# Patient Record
Sex: FEMALE | Race: WHITE | NOT HISPANIC OR LATINO | Employment: OTHER | RURAL
[De-identification: names, ages, dates, MRNs, and addresses within clinical notes are randomized per-mention and may not be internally consistent; named-entity substitution may affect disease eponyms.]

---

## 2022-10-02 ENCOUNTER — HOSPITAL ENCOUNTER (EMERGENCY)
Facility: HOSPITAL | Age: 77
Discharge: HOME | End: 2022-10-02
Attending: SPECIALIST
Payer: MEDICARE

## 2022-10-02 VITALS
OXYGEN SATURATION: 98 % | BODY MASS INDEX: 23.86 KG/M2 | WEIGHT: 152 LBS | SYSTOLIC BLOOD PRESSURE: 113 MMHG | RESPIRATION RATE: 20 BRPM | TEMPERATURE: 100 F | HEART RATE: 67 BPM | HEIGHT: 67 IN | DIASTOLIC BLOOD PRESSURE: 65 MMHG

## 2022-10-02 DIAGNOSIS — R05.1 ACUTE COUGH: Primary | ICD-10-CM

## 2022-10-02 DIAGNOSIS — R05.9 COUGH: ICD-10-CM

## 2022-10-02 DIAGNOSIS — U07.1 COVID: ICD-10-CM

## 2022-10-02 LAB
ANION GAP SERPL CALCULATED.3IONS-SCNC: 11 MMOL/L (ref 7–16)
BUN SERPL-MCNC: 25 MG/DL (ref 7–18)
BUN/CREAT SERPL: 20 (ref 6–20)
CALCIUM SERPL-MCNC: 8.4 MG/DL (ref 8.5–10.1)
CHLORIDE SERPL-SCNC: 98 MMOL/L (ref 98–107)
CO2 SERPL-SCNC: 28 MMOL/L (ref 21–32)
CREAT SERPL-MCNC: 1.26 MG/DL (ref 0.55–1.02)
EGFR (NO RACE VARIABLE) (RUSH/TITUS): 44 ML/MIN/1.73M²
FLUAV AG UPPER RESP QL IA.RAPID: NEGATIVE
FLUBV AG UPPER RESP QL IA.RAPID: NEGATIVE
GLUCOSE SERPL-MCNC: 98 MG/DL (ref 74–106)
HCO3 UR-SCNC: 29.7 MMOL/L (ref 21–28)
PCO2 BLDA: 39 MMHG (ref 35–48)
PH SMN: 7.49 [PH] (ref 7.35–7.45)
PO2 BLDA: 79 MMHG (ref 83–108)
POC BASE EXCESS: 5.9 MMOL/L (ref -2–3)
POC SATURATED O2: 97 %
POTASSIUM SERPL-SCNC: 4.8 MMOL/L (ref 3.5–5.1)
SARS-COV+SARS-COV-2 AG RESP QL IA.RAPID: POSITIVE
SODIUM SERPL-SCNC: 132 MMOL/L (ref 136–145)

## 2022-10-02 PROCEDURE — 82803 BLOOD GASES ANY COMBINATION: CPT

## 2022-10-02 PROCEDURE — 80048 BASIC METABOLIC PNL TOTAL CA: CPT | Performed by: SPECIALIST

## 2022-10-02 PROCEDURE — 63600175 PHARM REV CODE 636 W HCPCS: Performed by: SPECIALIST

## 2022-10-02 PROCEDURE — 87428 SARSCOV & INF VIR A&B AG IA: CPT | Performed by: SPECIALIST

## 2022-10-02 PROCEDURE — 99284 EMERGENCY DEPT VISIT MOD MDM: CPT | Mod: 25

## 2022-10-02 PROCEDURE — 96372 THER/PROPH/DIAG INJ SC/IM: CPT

## 2022-10-02 PROCEDURE — 99900035 HC TECH TIME PER 15 MIN (STAT)

## 2022-10-02 PROCEDURE — 36415 COLL VENOUS BLD VENIPUNCTURE: CPT | Performed by: SPECIALIST

## 2022-10-02 PROCEDURE — 99284 EMERGENCY DEPT VISIT MOD MDM: CPT | Performed by: SPECIALIST

## 2022-10-02 PROCEDURE — 36600 WITHDRAWAL OF ARTERIAL BLOOD: CPT

## 2022-10-02 RX ORDER — DEXAMETHASONE SODIUM PHOSPHATE 4 MG/ML
8 INJECTION, SOLUTION INTRA-ARTICULAR; INTRALESIONAL; INTRAMUSCULAR; INTRAVENOUS; SOFT TISSUE
Status: COMPLETED | OUTPATIENT
Start: 2022-10-02 | End: 2022-10-02

## 2022-10-02 RX ORDER — KETOROLAC TROMETHAMINE 30 MG/ML
30 INJECTION, SOLUTION INTRAMUSCULAR; INTRAVENOUS
Status: COMPLETED | OUTPATIENT
Start: 2022-10-02 | End: 2022-10-02

## 2022-10-02 RX ORDER — AZITHROMYCIN 250 MG/1
250 TABLET, FILM COATED ORAL DAILY
Qty: 6 TABLET | Refills: 0 | Status: SHIPPED | OUTPATIENT
Start: 2022-10-02

## 2022-10-02 RX ADMIN — KETOROLAC TROMETHAMINE 30 MG: 30 INJECTION, SOLUTION INTRAMUSCULAR; INTRAVENOUS at 05:10

## 2022-10-02 RX ADMIN — DEXAMETHASONE SODIUM PHOSPHATE 8 MG: 4 INJECTION, SOLUTION INTRAMUSCULAR; INTRAVENOUS at 05:10

## 2022-10-02 NOTE — DISCHARGE INSTRUCTIONS
Follow up with your doctor to see if you qualify for Paxlovid. Call physician's nurse in am  Increase fluids

## 2022-10-02 NOTE — ED PROVIDER NOTES
Encounter Date: 10/2/2022       History     Chief Complaint   Patient presents with    Cough     C/o cough and aching all over     Patient is a 76 yo wf who states that she is sure she has the flu.  She complains of having some breathing issues but she is at 98%.  She does smoke and for a long time.  She has a history of CAD, COPD, STENTS in heart on Plavix, HTN Asthma Gastritis and GERD.  Patient appears to be doing ok.  Fever is 99.9. She is not coughing at this time.    Review of patient's allergies indicates:   Allergen Reactions    Codeine Hives    Condrin-la Swelling    Levofloxacin Swelling    Tramadol Swelling    Penicillins Swelling    Demerol [meperidine] Rash     Past Medical History:   Diagnosis Date    Anemia     Arthritis     Asthma     COPD (chronic obstructive pulmonary disease)     Coronary artery disease     with stents on plavix    Gastritis     GERD (gastroesophageal reflux disease)     without gastritis    Hiatal hernia     Hypertension     Weight loss      Past Surgical History:   Procedure Laterality Date    BACK SURGERY      CATARACT EXTRACTION      CHOLECYSTECTOMY      lap    HYSTERECTOMY      TOTAL HIP ARTHROPLASTY       Family History   Problem Relation Age of Onset    Diabetes Mother     Anesthesia problems Neg Hx      Social History     Tobacco Use    Smoking status: Former     Packs/day: 0.50     Years: 20.00     Pack years: 10.00     Types: Cigarettes     Quit date: 1980     Years since quittin.7    Smokeless tobacco: Never   Substance Use Topics    Alcohol use: No    Drug use: No     Review of Systems   Eyes: Negative.    Respiratory:  Positive for cough and shortness of breath.    Cardiovascular: Negative.    Gastrointestinal: Negative.    Endocrine: Negative.    Genitourinary: Negative.    Musculoskeletal:  Positive for myalgias.   Allergic/Immunologic: Negative.    Neurological: Negative.    Hematological: Negative.    Psychiatric/Behavioral: Negative.     All other  systems reviewed and are negative.    Physical Exam     Initial Vitals   BP Pulse Resp Temp SpO2   10/02/22 1601 10/02/22 1550 10/02/22 1550 10/02/22 1550 10/02/22 1550   (!) 144/63 81 (!) 22 99.9 °F (37.7 °C) 98 %      MAP       --                Physical Exam    Nursing note and vitals reviewed.  Constitutional: She appears well-developed and well-nourished.   HENT:   Head: Normocephalic and atraumatic.   Eyes: Conjunctivae are normal. Pupils are equal, round, and reactive to light.   Neck: Neck supple.   Normal range of motion.  Cardiovascular:  Normal rate, regular rhythm and normal heart sounds.           Pulmonary/Chest: Breath sounds normal.   Abdominal: Abdomen is soft.   Musculoskeletal:         General: Normal range of motion.      Cervical back: Normal range of motion and neck supple.     Neurological: She is alert and oriented to person, place, and time. She has normal strength.   Skin: Skin is warm.   Psychiatric: She has a normal mood and affect. Her behavior is normal. Thought content normal.       Medical Screening Exam   See Full Note    ED Course   Procedures  Labs Reviewed   SARS-COV2 (COVID) W/ FLU ANTIGEN          Imaging Results              X-Ray Chest PA And Lateral (Final result)  Result time 10/02/22 16:19:04      Final result by Miguel Davidson II, MD (10/02/22 16:19:04)                   Impression:      No evidence of acute cardiopulmonary disease.      Electronically signed by: Miguel Davidson  Date:    10/02/2022  Time:    16:19               Narrative:    EXAMINATION:  XR CHEST PA AND LATERAL    CLINICAL HISTORY:  Cough, unspecified    COMPARISON:  None available    FINDINGS:  The heart and mediastinum are normal in size and configuration.  Left subclavian Port-A-Cath is in good position.  The pulmonary vascularity is normal in caliber.  No lung infiltrates, effusions, pneumothorax or other abnormality is demonstrated.                                       Medications - No data to  display                    Clinical Impression:   Final diagnoses:  [R05.9] Cough               Tata Vazquez MD  10/02/22 1883

## 2022-11-30 ENCOUNTER — OFFICE VISIT (OUTPATIENT)
Dept: ORTHOPEDICS | Facility: CLINIC | Age: 77
End: 2022-11-30
Payer: MEDICARE

## 2022-11-30 VITALS
HEIGHT: 67 IN | HEART RATE: 55 BPM | WEIGHT: 165.38 LBS | DIASTOLIC BLOOD PRESSURE: 69 MMHG | BODY MASS INDEX: 25.96 KG/M2 | SYSTOLIC BLOOD PRESSURE: 172 MMHG

## 2022-11-30 DIAGNOSIS — S52.502A CLOSED FRACTURE OF DISTAL END OF LEFT RADIUS, UNSPECIFIED FRACTURE MORPHOLOGY, INITIAL ENCOUNTER: Primary | ICD-10-CM

## 2022-11-30 PROCEDURE — 1125F AMNT PAIN NOTED PAIN PRSNT: CPT | Mod: CPTII,S$GLB,,

## 2022-11-30 PROCEDURE — 1100F PR PT FALLS ASSESS DOC 2+ FALLS/FALL W/INJURY/YR: ICD-10-PCS | Mod: CPTII,S$GLB,,

## 2022-11-30 PROCEDURE — 3077F SYST BP >= 140 MM HG: CPT | Mod: CPTII,S$GLB,,

## 2022-11-30 PROCEDURE — 1125F PR PAIN SEVERITY QUANTIFIED, PAIN PRESENT: ICD-10-PCS | Mod: CPTII,S$GLB,,

## 2022-11-30 PROCEDURE — 3078F DIAST BP <80 MM HG: CPT | Mod: CPTII,S$GLB,,

## 2022-11-30 PROCEDURE — 99999 PR PBB SHADOW E&M-EST. PATIENT-LVL IV: ICD-10-PCS | Mod: PBBFAC,,,

## 2022-11-30 PROCEDURE — 99999 PR PBB SHADOW E&M-EST. PATIENT-LVL IV: CPT | Mod: PBBFAC,,,

## 2022-11-30 PROCEDURE — 99204 PR OFFICE/OUTPT VISIT, NEW, LEVL IV, 45-59 MIN: ICD-10-PCS | Mod: S$GLB,,,

## 2022-11-30 PROCEDURE — 3288F FALL RISK ASSESSMENT DOCD: CPT | Mod: CPTII,S$GLB,,

## 2022-11-30 PROCEDURE — 3077F PR MOST RECENT SYSTOLIC BLOOD PRESSURE >= 140 MM HG: ICD-10-PCS | Mod: CPTII,S$GLB,,

## 2022-11-30 PROCEDURE — 99204 OFFICE O/P NEW MOD 45 MIN: CPT | Mod: S$GLB,,,

## 2022-11-30 PROCEDURE — 1159F MED LIST DOCD IN RCRD: CPT | Mod: CPTII,S$GLB,,

## 2022-11-30 PROCEDURE — 1159F PR MEDICATION LIST DOCUMENTED IN MEDICAL RECORD: ICD-10-PCS | Mod: CPTII,S$GLB,,

## 2022-11-30 PROCEDURE — 3078F PR MOST RECENT DIASTOLIC BLOOD PRESSURE < 80 MM HG: ICD-10-PCS | Mod: CPTII,S$GLB,,

## 2022-11-30 PROCEDURE — 3288F PR FALLS RISK ASSESSMENT DOCUMENTED: ICD-10-PCS | Mod: CPTII,S$GLB,,

## 2022-11-30 PROCEDURE — 1100F PTFALLS ASSESS-DOCD GE2>/YR: CPT | Mod: CPTII,S$GLB,,

## 2022-11-30 RX ORDER — IPRATROPIUM BROMIDE AND ALBUTEROL SULFATE 2.5; .5 MG/3ML; MG/3ML
SOLUTION RESPIRATORY (INHALATION)
COMMUNITY
Start: 2022-10-21

## 2022-11-30 RX ORDER — ALBUTEROL SULFATE 90 UG/1
AEROSOL, METERED RESPIRATORY (INHALATION)
COMMUNITY
Start: 2022-10-21

## 2022-11-30 RX ORDER — NIRMATRELVIR AND RITONAVIR 150-100 MG
KIT ORAL
COMMUNITY
Start: 2022-10-03

## 2022-11-30 RX ORDER — IBUPROFEN 800 MG/1
800 TABLET ORAL 3 TIMES DAILY
COMMUNITY
Start: 2022-10-28 | End: 2022-12-07

## 2022-11-30 NOTE — PROGRESS NOTES
SUBJECTIVE:      Chief Complaint: left wrist fracture    History of Present Illness:  Patient is a 77 y.o. right hand dominant female who presents today with complaints of left distal radius fracture. Patient was mopping this morning and sustained FOOSH type injury. Patient presented to [facility] ED on 11/30/2022, ED reduced fracture, placed short arm splint and patient was immediately sent to orthopedic clinic. She presents with her daughter and states she resides at her daughters for the time being.    The patient is a/an retired.    Onset of symptoms/DOI was 11/30/2022.    Symptoms are aggravated by movement.    Symptoms are alleviated by immobilization and medication.    Symptoms consist of pain, decreased ROM, and numbness/tingling.    The patient rates their pain as a 10/10.     The patient denies any fevers, chills, N/V, D/C and presents for evaluation.       Past Medical History:   Diagnosis Date    Anemia     Arthritis     Asthma     COPD (chronic obstructive pulmonary disease)     Coronary artery disease 1990's    with stents on plavix    Gastritis     GERD (gastroesophageal reflux disease)     without gastritis    Hiatal hernia     Hypertension     Weight loss      Past Surgical History:   Procedure Laterality Date    BACK SURGERY      CATARACT EXTRACTION      CHOLECYSTECTOMY      lap    HYSTERECTOMY      TOTAL HIP ARTHROPLASTY       Review of patient's allergies indicates:   Allergen Reactions    Codeine Hives    Condrin-la Swelling    Levofloxacin Swelling    Tramadol Swelling    Penicillins Swelling    Demerol [meperidine] Rash     Social History     Social History Narrative    Not on file     Family History   Problem Relation Age of Onset    Diabetes Mother     Anesthesia problems Neg Hx          Current Outpatient Medications:     acetaminophen (TYLENOL) 500 MG tablet, Take 1 tablet (500 mg total) by mouth every 6 (six) hours as needed for Pain., Disp: 30 tablet, Rfl: 0    azithromycin (Z-JAMAL)  250 MG tablet, Take 1 tablet (250 mg total) by mouth once daily. Take first 2 tablets together, then 1 every day until finished., Disp: 6 tablet, Rfl: 0    benzonatate (TESSALON) 100 MG capsule, Take 100 mg by mouth 3 (three) times daily as needed for Cough., Disp: , Rfl:     citalopram (CELEXA) 20 MG tablet, Take 20 mg by mouth once daily., Disp: , Rfl:     cyclobenzaprine (FLEXERIL) 10 MG tablet, Take 1 tablet (10 mg total) by mouth 3 (three) times daily as needed for Muscle spasms., Disp: 20 tablet, Rfl: 0    dexlansoprazole (DEXILANT) 60 mg capsule, Take 60 mg by mouth once daily., Disp: , Rfl:     ezetimibe (ZETIA) 10 mg tablet, Take 10 mg by mouth once daily., Disp: , Rfl:     furosemide (LASIX) 20 MG tablet, Take 20 mg by mouth once daily. , Disp: , Rfl:     ibuprofen (ADVIL,MOTRIN) 600 MG tablet, Take 1 tablet (600 mg total) by mouth every 6 (six) hours as needed for Pain., Disp: 20 tablet, Rfl: 0    lubiprostone (AMITIZA) 8 MCG Cap, Take 8 mcg by mouth 2 (two) times daily with meals., Disp: , Rfl:     meclizine (ANTIVERT) 32 MG tablet, Take 12.5 mg by mouth 3 (three) times daily as needed., Disp: , Rfl:     meloxicam (MOBIC) 15 MG tablet, Take 15 mg by mouth once daily., Disp: , Rfl:     metoprolol succinate (TOPROL-XL) 50 MG 24 hr tablet, Take 50 mg by mouth once daily., Disp: , Rfl:     montelukast (SINGULAIR) 10 mg tablet, Take 10 mg by mouth once daily. , Disp: , Rfl:     ondansetron (ZOFRAN) 4 MG tablet, Take 4 mg by mouth every 8 (eight) hours as needed for Nausea., Disp: , Rfl:     ranitidine (ZANTAC) 300 MG capsule, Take 300 mg by mouth once daily. , Disp: , Rfl:     ropinirole (REQUIP) 0.5 MG tablet, , Disp: , Rfl:     ropinirole (REQUIP) 5 MG tablet, Take 5 mg by mouth once daily. , Disp: , Rfl:     sucralfate (CARAFATE) 1 gram tablet, Take 1 g by mouth 4 (four) times daily., Disp: , Rfl:     SYMBICORT 80-4.5 mcg/actuation HFAA, , Disp: , Rfl:     valsartan (DIOVAN) 160 MG tablet, Take 160 mg by  mouth once daily., Disp: , Rfl:   No current facility-administered medications for this visit.      Review of Systems:  Constitutional: no fever or chills  Eyes: no visual changes  ENT: no nasal congestion or sore throat  Respiratory: no cough or shortness of breath  Cardiovascular: no chest pain  Gastrointestinal: no nausea or vomiting, tolerating diet  Musculoskeletal: pain and soreness    OBJECTIVE:      Vital Signs (Most Recent):  There were no vitals filed for this visit.  There is no height or weight on file to calculate BMI.      Physical Exam:  Constitutional: The patient appears well-developed and well-nourished. No distress.   Skin: No lesions appreciated  Head: Normocephalic and atraumatic.   Nose: Nose normal.   Ears: No deformities seen  Eyes: Conjunctivae and EOM are normal.   Neck: No tracheal deviation present.   Cardiovascular: Normal rate and intact distal pulses.    Pulmonary/Chest: Effort normal. No respiratory distress.   Abdominal: There is no guarding.   Neurological: The patient is alert.   Psychiatric: The patient has a normal mood and affect.     Left Hand/Wrist Examination:  Splint in place. Capillary refill 2+.     Diagnostic Results:     Imaging - I independently viewed the patient's imaging as well as the radiology report.  Xrays of the patient's left wrist  demonstrate distal radial acute fracture.     ASSESSMENT/PLAN:      Closed fracture of the distal end of left radius    I made the decision to obtain old records of the patient including previous notes and imaging. If new imaging was ordered today of the extremity or extremities evaluated. I independently reviewed and interpreted the left wrist as well as prior imaging. Reviewed imaging in detail with patient.     We discussed at length different treatment options including conservative vs surgical management. These include anti-inflammatories, acetaminophen, rest, ice, heat, formal physical therapy including strengthening and  stretching exercises, home exercise programs, injections, and finally surgical intervention.      Discussed case with supervising physician. Imaging reviewed by myself as well as supervising physician, Sathya Hutchinson MD. Recommendation of serial imaging and immobilization.     Patient to RTC in 1 week for repeat xrays in splint. Splint to remain on 2 weeks, then possibly transition to short arm cast.   Alternate between ibuprofen and tylenol as needed for pain management    Follow up: 1 week  Xrays needed: left wrist in splint     All of the patient's questions were answered and the patient will contact us if they have any questions or concerns in the interim.

## 2022-12-07 ENCOUNTER — OFFICE VISIT (OUTPATIENT)
Dept: ORTHOPEDICS | Facility: CLINIC | Age: 77
End: 2022-12-07
Payer: MEDICARE

## 2022-12-07 VITALS
DIASTOLIC BLOOD PRESSURE: 72 MMHG | WEIGHT: 165.13 LBS | HEIGHT: 67 IN | BODY MASS INDEX: 25.92 KG/M2 | SYSTOLIC BLOOD PRESSURE: 136 MMHG | HEART RATE: 62 BPM

## 2022-12-07 DIAGNOSIS — S52.502D CLOSED FRACTURE OF DISTAL END OF LEFT RADIUS WITH ROUTINE HEALING, UNSPECIFIED FRACTURE MORPHOLOGY, SUBSEQUENT ENCOUNTER: Primary | ICD-10-CM

## 2022-12-07 PROCEDURE — 3288F PR FALLS RISK ASSESSMENT DOCUMENTED: ICD-10-PCS | Mod: CPTII,S$GLB,,

## 2022-12-07 PROCEDURE — 1125F AMNT PAIN NOTED PAIN PRSNT: CPT | Mod: CPTII,S$GLB,,

## 2022-12-07 PROCEDURE — 1101F PR PT FALLS ASSESS DOC 0-1 FALLS W/OUT INJ PAST YR: ICD-10-PCS | Mod: CPTII,S$GLB,,

## 2022-12-07 PROCEDURE — 3075F PR MOST RECENT SYSTOLIC BLOOD PRESS GE 130-139MM HG: ICD-10-PCS | Mod: CPTII,S$GLB,,

## 2022-12-07 PROCEDURE — 1159F MED LIST DOCD IN RCRD: CPT | Mod: CPTII,S$GLB,,

## 2022-12-07 PROCEDURE — 3288F FALL RISK ASSESSMENT DOCD: CPT | Mod: CPTII,S$GLB,,

## 2022-12-07 PROCEDURE — 1101F PT FALLS ASSESS-DOCD LE1/YR: CPT | Mod: CPTII,S$GLB,,

## 2022-12-07 PROCEDURE — 3078F PR MOST RECENT DIASTOLIC BLOOD PRESSURE < 80 MM HG: ICD-10-PCS | Mod: CPTII,S$GLB,,

## 2022-12-07 PROCEDURE — 99213 PR OFFICE/OUTPT VISIT, EST, LEVL III, 20-29 MIN: ICD-10-PCS | Mod: FS,S$GLB,,

## 2022-12-07 PROCEDURE — 99213 OFFICE O/P EST LOW 20 MIN: CPT | Mod: FS,S$GLB,,

## 2022-12-07 PROCEDURE — 1159F PR MEDICATION LIST DOCUMENTED IN MEDICAL RECORD: ICD-10-PCS | Mod: CPTII,S$GLB,,

## 2022-12-07 PROCEDURE — 99999 PR PBB SHADOW E&M-EST. PATIENT-LVL IV: CPT | Mod: PBBFAC,,,

## 2022-12-07 PROCEDURE — 1125F PR PAIN SEVERITY QUANTIFIED, PAIN PRESENT: ICD-10-PCS | Mod: CPTII,S$GLB,,

## 2022-12-07 PROCEDURE — 99999 PR PBB SHADOW E&M-EST. PATIENT-LVL IV: ICD-10-PCS | Mod: PBBFAC,,,

## 2022-12-07 PROCEDURE — 3075F SYST BP GE 130 - 139MM HG: CPT | Mod: CPTII,S$GLB,,

## 2022-12-07 PROCEDURE — 3078F DIAST BP <80 MM HG: CPT | Mod: CPTII,S$GLB,,

## 2022-12-07 RX ORDER — IBUPROFEN 600 MG/1
600 TABLET ORAL EVERY 6 HOURS PRN
Qty: 90 TABLET | Refills: 1 | Status: SHIPPED | OUTPATIENT
Start: 2022-12-07

## 2022-12-07 NOTE — PROGRESS NOTES
SUBJECTIVE:      Chief Complaint: left wrist fracture    History of Present Illness:  Patient is a 77 y.o. right hand dominant female who presents today with complaints of left distal radius fracture. Patient was mopping this morning and sustained FOOSH type injury. Patient presented to [place] ED on 11/30/2022, ED reduced fracture, placed short arm splint and patient was immediately sent to orthopedic clinic. She presents with her daughter and states she resides at her daughters for the time being.    The patient is a/an retired.    Onset of symptoms/DOI was 11/30/2022.    Symptoms are aggravated by movement.    Symptoms are alleviated by immobilization and medication.    Symptoms consist of pain, decreased ROM, and numbness/tingling.    The patient rates their pain as a 10/10.     The patient denies any fevers, chills, N/V, D/C and presents for evaluation.     ____________________________________________________________________    Interval history 12/07/2022 : Patient returns today for 1 week follow up of left distal radius fracture.  She has been taking Ibuprofen 4x daily for pain, does not want to take tylenol if she does not have to. No concerns today.       Past Medical History:   Diagnosis Date    Anemia     Arthritis     Asthma     COPD (chronic obstructive pulmonary disease)     Coronary artery disease 1990's    with stents on plavix    Gastritis     GERD (gastroesophageal reflux disease)     without gastritis    Hiatal hernia     Hypertension     Weight loss      Past Surgical History:   Procedure Laterality Date    BACK SURGERY      CATARACT EXTRACTION      CHOLECYSTECTOMY      lap    HYSTERECTOMY      TOTAL HIP ARTHROPLASTY       Review of patient's allergies indicates:   Allergen Reactions    Codeine Hives    Condrin-la Swelling    Levofloxacin Swelling    Tramadol Swelling    Penicillins Swelling    Demerol [meperidine] Rash     Social History     Social History Narrative    Not on file      Family History   Problem Relation Age of Onset    Diabetes Mother     Anesthesia problems Neg Hx          Current Outpatient Medications:     acetaminophen (TYLENOL) 500 MG tablet, Take 1 tablet (500 mg total) by mouth every 6 (six) hours as needed for Pain., Disp: 30 tablet, Rfl: 0    albuterol (PROVENTIL/VENTOLIN HFA) 90 mcg/actuation inhaler, Inhale into the lungs., Disp: , Rfl:     albuterol-ipratropium (DUO-NEB) 2.5 mg-0.5 mg/3 mL nebulizer solution, Take by nebulization., Disp: , Rfl:     azithromycin (Z-JAMAL) 250 MG tablet, Take 1 tablet (250 mg total) by mouth once daily. Take first 2 tablets together, then 1 every day until finished., Disp: 6 tablet, Rfl: 0    benzonatate (TESSALON) 100 MG capsule, Take 100 mg by mouth 3 (three) times daily as needed for Cough., Disp: , Rfl:     citalopram (CELEXA) 20 MG tablet, Take 20 mg by mouth once daily., Disp: , Rfl:     cyclobenzaprine (FLEXERIL) 10 MG tablet, Take 1 tablet (10 mg total) by mouth 3 (three) times daily as needed for Muscle spasms., Disp: 20 tablet, Rfl: 0    dexlansoprazole (DEXILANT) 60 mg capsule, Take 60 mg by mouth once daily., Disp: , Rfl:     ezetimibe (ZETIA) 10 mg tablet, Take 10 mg by mouth once daily., Disp: , Rfl:     furosemide (LASIX) 20 MG tablet, Take 20 mg by mouth once daily. , Disp: , Rfl:     ibuprofen (ADVIL,MOTRIN) 600 MG tablet, Take 1 tablet (600 mg total) by mouth every 6 (six) hours as needed for Pain., Disp: 20 tablet, Rfl: 0    ibuprofen (ADVIL,MOTRIN) 800 MG tablet, Take 800 mg by mouth 3 (three) times daily., Disp: , Rfl:     lubiprostone (AMITIZA) 8 MCG Cap, Take 8 mcg by mouth 2 (two) times daily with meals., Disp: , Rfl:     meclizine (ANTIVERT) 32 MG tablet, Take 12.5 mg by mouth 3 (three) times daily as needed., Disp: , Rfl:     meloxicam (MOBIC) 15 MG tablet, Take 15 mg by mouth once daily., Disp: , Rfl:     metoprolol succinate (TOPROL-XL) 50 MG 24 hr tablet, Take 50 mg by mouth once daily., Disp: , Rfl:      montelukast (SINGULAIR) 10 mg tablet, Take 10 mg by mouth once daily. , Disp: , Rfl:     ondansetron (ZOFRAN) 4 MG tablet, Take 4 mg by mouth every 8 (eight) hours as needed for Nausea., Disp: , Rfl:     PAXLOVID, EUA, 150-100 mg DsPk, Take by mouth., Disp: , Rfl:     ranitidine (ZANTAC) 300 MG capsule, Take 300 mg by mouth once daily. , Disp: , Rfl:     ropinirole (REQUIP) 0.5 MG tablet, , Disp: , Rfl:     ropinirole (REQUIP) 5 MG tablet, Take 5 mg by mouth once daily. , Disp: , Rfl:     sucralfate (CARAFATE) 1 gram tablet, Take 1 g by mouth 4 (four) times daily., Disp: , Rfl:     SYMBICORT 80-4.5 mcg/actuation HFAA, , Disp: , Rfl:     valsartan (DIOVAN) 160 MG tablet, Take 160 mg by mouth once daily., Disp: , Rfl:       Review of Systems:  Constitutional: no fever or chills  Eyes: no visual changes  ENT: no nasal congestion or sore throat  Respiratory: no cough or shortness of breath  Cardiovascular: no chest pain  Gastrointestinal: no nausea or vomiting, tolerating diet  Musculoskeletal: pain and soreness    OBJECTIVE:      Vital Signs (Most Recent):  There were no vitals filed for this visit.  There is no height or weight on file to calculate BMI.      Physical Exam:  Constitutional: The patient appears well-developed and well-nourished. No distress.   Skin: No lesions appreciated  Head: Normocephalic and atraumatic.   Nose: Nose normal.   Ears: No deformities seen  Eyes: Conjunctivae and EOM are normal.   Neck: No tracheal deviation present.   Cardiovascular: Normal rate and intact distal pulses.    Pulmonary/Chest: Effort normal. No respiratory distress.   Abdominal: There is no guarding.   Neurological: The patient is alert.   Psychiatric: The patient has a normal mood and affect.     Left Hand/Wrist Examination:  Splint removed, there is moderate swelling and bruising to left forearm and distal radius. TTP over volar distal radius, radial pulse 2+. Capillary refill 2+. Sensation intact. Splint reapplied.      Diagnostic Results:     Imaging - I independently viewed the patient's imaging as well as the radiology report.  Xrays of the patient's left wrist  demonstrate distal radial acute fracture.     Xray left wrist 12/7/2022: distal radial acute fracture with routine healing, no displacement noted.     ASSESSMENT/PLAN:      Closed fracture of the distal end of left radius    I made the decision to obtain old records of the patient including previous notes and imaging. If new imaging was ordered today of the extremity or extremities evaluated. I independently reviewed and interpreted the left wrist as well as prior imaging. Reviewed imaging in detail with patient.     We discussed at length different treatment options including conservative vs surgical management. These include anti-inflammatories, acetaminophen, rest, ice, heat, formal physical therapy including strengthening and stretching exercises, home exercise programs, injections, and finally surgical intervention.      Left distal radius fracture, healing well.     Patient to RTC in 1 week for repeat xrays in splint. Splint to remain on 1 more week, then transition to short arm cast x 4 weeks.   Alternate between ibuprofen and tylenol as needed for pain management  Ibuprofen refill sent, make take up to 4x daily  Encouraged high protein diet with lean meats    Follow up: 1 week  Xrays needed: left wrist in splint     All of the patient's questions were answered and the patient will contact us if they have any questions or concerns in the interim.

## 2022-12-14 ENCOUNTER — TELEPHONE (OUTPATIENT)
Dept: ORTHOPEDICS | Facility: CLINIC | Age: 77
End: 2022-12-14
Payer: MEDICARE

## 2022-12-14 ENCOUNTER — OFFICE VISIT (OUTPATIENT)
Dept: ORTHOPEDICS | Facility: CLINIC | Age: 77
End: 2022-12-14
Payer: MEDICARE

## 2022-12-14 VITALS
HEART RATE: 78 BPM | BODY MASS INDEX: 25.37 KG/M2 | SYSTOLIC BLOOD PRESSURE: 94 MMHG | DIASTOLIC BLOOD PRESSURE: 38 MMHG | WEIGHT: 161.63 LBS | HEIGHT: 67 IN

## 2022-12-14 DIAGNOSIS — S52.502D CLOSED FRACTURE OF DISTAL END OF LEFT RADIUS WITH ROUTINE HEALING, UNSPECIFIED FRACTURE MORPHOLOGY, SUBSEQUENT ENCOUNTER: Primary | ICD-10-CM

## 2022-12-14 PROCEDURE — 3288F FALL RISK ASSESSMENT DOCD: CPT | Mod: CPTII,S$GLB,,

## 2022-12-14 PROCEDURE — 99213 OFFICE O/P EST LOW 20 MIN: CPT | Mod: S$GLB,,,

## 2022-12-14 PROCEDURE — 1101F PR PT FALLS ASSESS DOC 0-1 FALLS W/OUT INJ PAST YR: ICD-10-PCS | Mod: CPTII,S$GLB,,

## 2022-12-14 PROCEDURE — 1101F PT FALLS ASSESS-DOCD LE1/YR: CPT | Mod: CPTII,S$GLB,,

## 2022-12-14 PROCEDURE — 3074F SYST BP LT 130 MM HG: CPT | Mod: CPTII,S$GLB,,

## 2022-12-14 PROCEDURE — 3288F PR FALLS RISK ASSESSMENT DOCUMENTED: ICD-10-PCS | Mod: CPTII,S$GLB,,

## 2022-12-14 PROCEDURE — 1125F PR PAIN SEVERITY QUANTIFIED, PAIN PRESENT: ICD-10-PCS | Mod: CPTII,S$GLB,,

## 2022-12-14 PROCEDURE — 3078F DIAST BP <80 MM HG: CPT | Mod: CPTII,S$GLB,,

## 2022-12-14 PROCEDURE — 99999 PR PBB SHADOW E&M-EST. PATIENT-LVL IV: CPT | Mod: PBBFAC,,,

## 2022-12-14 PROCEDURE — 99213 PR OFFICE/OUTPT VISIT, EST, LEVL III, 20-29 MIN: ICD-10-PCS | Mod: S$GLB,,,

## 2022-12-14 PROCEDURE — 1159F MED LIST DOCD IN RCRD: CPT | Mod: CPTII,S$GLB,,

## 2022-12-14 PROCEDURE — 1125F AMNT PAIN NOTED PAIN PRSNT: CPT | Mod: CPTII,S$GLB,,

## 2022-12-14 PROCEDURE — 99999 PR PBB SHADOW E&M-EST. PATIENT-LVL IV: ICD-10-PCS | Mod: PBBFAC,,,

## 2022-12-14 PROCEDURE — 3074F PR MOST RECENT SYSTOLIC BLOOD PRESSURE < 130 MM HG: ICD-10-PCS | Mod: CPTII,S$GLB,,

## 2022-12-14 PROCEDURE — 3078F PR MOST RECENT DIASTOLIC BLOOD PRESSURE < 80 MM HG: ICD-10-PCS | Mod: CPTII,S$GLB,,

## 2022-12-14 PROCEDURE — 1159F PR MEDICATION LIST DOCUMENTED IN MEDICAL RECORD: ICD-10-PCS | Mod: CPTII,S$GLB,,

## 2022-12-14 NOTE — TELEPHONE ENCOUNTER
Spoke with pt. Advised pt that she can come in now. All questions answered. Pt verbalized understanding.

## 2022-12-14 NOTE — TELEPHONE ENCOUNTER
----- Message from Carmenza Gurrola sent at 12/14/2022  9:21 AM CST -----  Pt is calling to see is she can come in sooner due to the weather or tomorrow. Pt can be reached at [phone]

## 2022-12-14 NOTE — PROGRESS NOTES
SUBJECTIVE:      Chief Complaint: left wrist fracture    History of Present Illness:  Patient is a 77 y.o. right hand dominant female who presents today with complaints of left distal radius fracture. Patient was mopping this morning and sustained FOOSH type injury. Patient presented to [place] ED on 11/30/2022, ED reduced fracture, placed short arm splint and patient was immediately sent to orthopedic clinic. She presents with her daughter and states she resides at her daughters for the time being.    The patient is a/an retired.    Onset of symptoms/DOI was 11/30/2022.    Symptoms are aggravated by movement.    Symptoms are alleviated by immobilization and medication.    Symptoms consist of pain, decreased ROM, and numbness/tingling.    The patient rates their pain as a 10/10.     The patient denies any fevers, chills, N/V, D/C and presents for evaluation.     ____________________________________________________________________    Interval history 12/7/2022 : Patient returns today for 1 week follow up of left distal radius fracture.  She has been taking Ibuprofen 4x daily for pain, does not want to take tylenol if she does not have to. No concerns today.     ____________________________________________________________________    Interval history 12/14/2022 : Patient returns today for 2 week follow up of left distal radius fracture. Her BP today is 94/38 and she endorses CP yesterday and is currently endorsing dizziness. Reports she did take her blood pressure medicine this morning. Denies fevers, nausea, vomiting or SOB. Pain 9/10 today.         Past Medical History:   Diagnosis Date    Anemia     Arthritis     Asthma     COPD (chronic obstructive pulmonary disease)     Coronary artery disease 1990's    with stents on plavix    Gastritis     GERD (gastroesophageal reflux disease)     without gastritis    Hiatal hernia     Hypertension     Weight loss      Past Surgical History:   Procedure Laterality Date     BACK SURGERY      CATARACT EXTRACTION      CHOLECYSTECTOMY      lap    HYSTERECTOMY      TOTAL HIP ARTHROPLASTY       Review of patient's allergies indicates:   Allergen Reactions    Codeine Hives    Condrin-la Swelling    Levofloxacin Swelling    Tramadol Swelling    Penicillins Swelling    Demerol [meperidine] Rash     Social History     Social History Narrative    Not on file     Family History   Problem Relation Age of Onset    Diabetes Mother     Anesthesia problems Neg Hx          Current Outpatient Medications:     acetaminophen (TYLENOL) 500 MG tablet, Take 1 tablet (500 mg total) by mouth every 6 (six) hours as needed for Pain., Disp: 30 tablet, Rfl: 0    albuterol (PROVENTIL/VENTOLIN HFA) 90 mcg/actuation inhaler, Inhale into the lungs., Disp: , Rfl:     albuterol-ipratropium (DUO-NEB) 2.5 mg-0.5 mg/3 mL nebulizer solution, Take by nebulization., Disp: , Rfl:     azithromycin (Z-JAMAL) 250 MG tablet, Take 1 tablet (250 mg total) by mouth once daily. Take first 2 tablets together, then 1 every day until finished., Disp: 6 tablet, Rfl: 0    benzonatate (TESSALON) 100 MG capsule, Take 100 mg by mouth 3 (three) times daily as needed for Cough., Disp: , Rfl:     citalopram (CELEXA) 20 MG tablet, Take 20 mg by mouth once daily., Disp: , Rfl:     cyclobenzaprine (FLEXERIL) 10 MG tablet, Take 1 tablet (10 mg total) by mouth 3 (three) times daily as needed for Muscle spasms., Disp: 20 tablet, Rfl: 0    dexlansoprazole (DEXILANT) 60 mg capsule, Take 60 mg by mouth once daily., Disp: , Rfl:     ezetimibe (ZETIA) 10 mg tablet, Take 10 mg by mouth once daily., Disp: , Rfl:     furosemide (LASIX) 20 MG tablet, Take 20 mg by mouth once daily. , Disp: , Rfl:     ibuprofen (ADVIL,MOTRIN) 600 MG tablet, Take 1 tablet (600 mg total) by mouth every 6 (six) hours as needed for Pain., Disp: 90 tablet, Rfl: 1    lubiprostone (AMITIZA) 8 MCG Cap, Take 8 mcg by mouth 2 (two) times daily with meals., Disp: , Rfl:     meclizine  "(ANTIVERT) 32 MG tablet, Take 12.5 mg by mouth 3 (three) times daily as needed., Disp: , Rfl:     meloxicam (MOBIC) 15 MG tablet, Take 15 mg by mouth once daily., Disp: , Rfl:     metoprolol succinate (TOPROL-XL) 50 MG 24 hr tablet, Take 50 mg by mouth once daily., Disp: , Rfl:     montelukast (SINGULAIR) 10 mg tablet, Take 10 mg by mouth once daily. , Disp: , Rfl:     PAXLOVID, EUA, 150-100 mg DsPk, Take by mouth., Disp: , Rfl:     ranitidine (ZANTAC) 300 MG capsule, Take 300 mg by mouth once daily. , Disp: , Rfl:     ropinirole (REQUIP) 0.5 MG tablet, , Disp: , Rfl:     ropinirole (REQUIP) 5 MG tablet, Take 5 mg by mouth once daily. , Disp: , Rfl:     sucralfate (CARAFATE) 1 gram tablet, Take 1 g by mouth 4 (four) times daily., Disp: , Rfl:     SYMBICORT 80-4.5 mcg/actuation HFAA, , Disp: , Rfl:     valsartan (DIOVAN) 160 MG tablet, Take 160 mg by mouth once daily., Disp: , Rfl:     ondansetron (ZOFRAN) 4 MG tablet, Take 4 mg by mouth every 8 (eight) hours as needed for Nausea., Disp: , Rfl:       Review of Systems:  Constitutional: no fever or chills  Eyes: no visual changes  ENT: no nasal congestion or sore throat  Respiratory: no cough or shortness of breath  Cardiovascular: no chest pain  Gastrointestinal: no nausea or vomiting, tolerating diet  Musculoskeletal: pain and soreness    OBJECTIVE:      Vital Signs (Most Recent):  Vitals:    12/14/22 1133   BP: (!) 94/38   BP Location: Right arm   Patient Position: Sitting   BP Method: Medium (Automatic)   Pulse: 78   Weight: 73.3 kg (161 lb 9.6 oz)   Height: 5' 7" (1.702 m)     Body mass index is 25.31 kg/m².      Physical Exam:  Constitutional: The patient appears well-developed and well-nourished. No distress.   Skin: No lesions appreciated  Head: Normocephalic and atraumatic.   Nose: Nose normal.   Ears: No deformities seen  Eyes: Conjunctivae and EOM are normal.   Neck: No tracheal deviation present.   Cardiovascular: Normal rate and intact distal pulses.  "   Pulmonary/Chest: Effort normal. No respiratory distress.   Abdominal: There is no guarding.   Neurological: The patient is alert.   Psychiatric: The patient has a normal mood and affect.     Left Hand/Wrist Examination:  Splint removed, there is moderate swelling and bruising to left forearm and distal radius. TTP over volar distal radius, radial pulse 2+. Capillary refill 2+. Sensation intact. Splint reapplied.     Diagnostic Results:     Imaging - I independently viewed the patient's imaging as well as the radiology report.  Xrays of the patient's left wrist  demonstrate distal radial acute fracture.     Xray left wrist 12/7/2022: distal radial acute fracture with routine healing, no displacement noted.     Xray left wrist 12/14/2022: distal radial acute fracture, slight displacement, this has changed from prior imaging.     ASSESSMENT/PLAN:      Closed fracture of the distal end of left radius    I made the decision to obtain old records of the patient including previous notes and imaging. If new imaging was ordered today of the extremity or extremities evaluated. I independently reviewed and interpreted the left wrist as well as prior imaging. Reviewed imaging in detail with patient.     We discussed at length different treatment options including conservative vs surgical management. These include anti-inflammatories, acetaminophen, rest, ice, heat, formal physical therapy including strengthening and stretching exercises, home exercise programs, injections, and finally surgical intervention.      Patient to immediately go to ER as her BP is 94/38 and she is reporting dizziness and chest pain yesterday. Medical assistant, Lele has taken patient to ER via wheelchair.   Alternate between ibuprofen and tylenol as needed for pain management  Encouraged high protein diet with lean meats  Follow-up in 1 week for repeat imaging as well as possible cast placement versus surgical consideration.    Follow up: 1  week  Xrays needed: left wrist in splint     All of the patient's questions were answered and the patient will contact us if they have any questions or concerns in the interim.

## 2022-12-16 ENCOUNTER — TELEPHONE (OUTPATIENT)
Dept: ORTHOPEDICS | Facility: CLINIC | Age: 77
End: 2022-12-16
Payer: MEDICARE

## 2022-12-16 NOTE — TELEPHONE ENCOUNTER
----- Message from Cynthia Mcknight sent at 12/16/2022  3:49 PM CST -----  Regarding: Appt  Contact: Sarah Gaona [phone]  Pt was seen on 12/14 had to be rushed to ED and was told she would be seen on 12/21 pt is not scheduled nor did she received time. Please call caregiver Sarah Gaona

## 2022-12-21 ENCOUNTER — OFFICE VISIT (OUTPATIENT)
Dept: ORTHOPEDICS | Facility: CLINIC | Age: 77
End: 2022-12-21
Payer: MEDICARE

## 2022-12-21 VITALS
SYSTOLIC BLOOD PRESSURE: 156 MMHG | WEIGHT: 168.88 LBS | HEIGHT: 67 IN | BODY MASS INDEX: 26.51 KG/M2 | DIASTOLIC BLOOD PRESSURE: 66 MMHG

## 2022-12-21 DIAGNOSIS — S52.502D CLOSED FRACTURE OF DISTAL END OF LEFT RADIUS WITH ROUTINE HEALING, UNSPECIFIED FRACTURE MORPHOLOGY, SUBSEQUENT ENCOUNTER: Primary | ICD-10-CM

## 2022-12-21 PROCEDURE — 3077F PR MOST RECENT SYSTOLIC BLOOD PRESSURE >= 140 MM HG: ICD-10-PCS | Mod: CPTII,S$GLB,,

## 2022-12-21 PROCEDURE — 3078F PR MOST RECENT DIASTOLIC BLOOD PRESSURE < 80 MM HG: ICD-10-PCS | Mod: CPTII,S$GLB,,

## 2022-12-21 PROCEDURE — 99214 OFFICE O/P EST MOD 30 MIN: CPT | Mod: S$GLB,,,

## 2022-12-21 PROCEDURE — 99214 PR OFFICE/OUTPT VISIT, EST, LEVL IV, 30-39 MIN: ICD-10-PCS | Mod: S$GLB,,,

## 2022-12-21 PROCEDURE — 1125F AMNT PAIN NOTED PAIN PRSNT: CPT | Mod: CPTII,S$GLB,,

## 2022-12-21 PROCEDURE — 3078F DIAST BP <80 MM HG: CPT | Mod: CPTII,S$GLB,,

## 2022-12-21 PROCEDURE — 1125F PR PAIN SEVERITY QUANTIFIED, PAIN PRESENT: ICD-10-PCS | Mod: CPTII,S$GLB,,

## 2022-12-21 PROCEDURE — 99999 PR PBB SHADOW E&M-EST. PATIENT-LVL IV: ICD-10-PCS | Mod: PBBFAC,,,

## 2022-12-21 PROCEDURE — 99999 PR PBB SHADOW E&M-EST. PATIENT-LVL IV: CPT | Mod: PBBFAC,,,

## 2022-12-21 PROCEDURE — 1159F PR MEDICATION LIST DOCUMENTED IN MEDICAL RECORD: ICD-10-PCS | Mod: CPTII,S$GLB,,

## 2022-12-21 PROCEDURE — 3077F SYST BP >= 140 MM HG: CPT | Mod: CPTII,S$GLB,,

## 2022-12-21 PROCEDURE — 1159F MED LIST DOCD IN RCRD: CPT | Mod: CPTII,S$GLB,,

## 2022-12-21 NOTE — PROGRESS NOTES
SUBJECTIVE:      Chief Complaint: left wrist fracture    History of Present Illness:  Patient is a 77 y.o. right hand dominant female who presents today with complaints of left distal radius fracture. Patient was mopping this morning and sustained FOOSH type injury. Patient presented to [place] [facility] ED on 11/30/2022, ED reduced fracture, placed short arm splint and patient was immediately sent to orthopedic clinic. She presents with her daughter and states she resides at her daughters for the time being.    The patient is a/an retired.    Onset of symptoms/DOI was 11/30/2022.    Symptoms are aggravated by movement.    Symptoms are alleviated by immobilization and medication.    Symptoms consist of pain, decreased ROM, and numbness/tingling.    The patient rates their pain as a 10/10.     The patient denies any fevers, chills, N/V, D/C and presents for evaluation.     ____________________________________________________________________    Interval history 12/7/2022 : Patient returns today for 1 week follow up of left distal radius fracture.  She has been taking Ibuprofen 4x daily for pain, does not want to take tylenol if she does not have to. No concerns today.     ____________________________________________________________________    Interval history 12/14/2022 : Patient returns today for 2 week follow up of left distal radius fracture. Her BP today is 94/38 and she endorses CP yesterday and is currently endorsing dizziness. Reports she did take her blood pressure medicine this morning. Denies fevers, nausea, vomiting or SOB. Pain 9/10 today.     ____________________________________________________________________    Interval history 12/21/2022 : Patient returns today for follow up of left distal radius fracture. Presents to clinic with daughter. At last visit she was moderately swollen and BP was 94/38, she was placed in short arm splint and sent to the ER. Since this, patient reports mild continued pain to left  forearm. No other concerns at his time.           Past Medical History:   Diagnosis Date    Anemia     Arthritis     Asthma     COPD (chronic obstructive pulmonary disease)     Coronary artery disease 1990's    with stents on plavix    Gastritis     GERD (gastroesophageal reflux disease)     without gastritis    Hiatal hernia     Hypertension     Weight loss      Past Surgical History:   Procedure Laterality Date    BACK SURGERY      CATARACT EXTRACTION      CHOLECYSTECTOMY      lap    HYSTERECTOMY      TOTAL HIP ARTHROPLASTY       Review of patient's allergies indicates:   Allergen Reactions    Codeine Hives    Condrin-la Swelling    Levofloxacin Swelling    Tramadol Swelling    Penicillins Swelling    Demerol [meperidine] Rash     Social History     Social History Narrative    Not on file     Family History   Problem Relation Age of Onset    Diabetes Mother     Anesthesia problems Neg Hx          Current Outpatient Medications:     acetaminophen (TYLENOL) 500 MG tablet, Take 1 tablet (500 mg total) by mouth every 6 (six) hours as needed for Pain., Disp: 30 tablet, Rfl: 0    albuterol (PROVENTIL/VENTOLIN HFA) 90 mcg/actuation inhaler, Inhale into the lungs., Disp: , Rfl:     albuterol-ipratropium (DUO-NEB) 2.5 mg-0.5 mg/3 mL nebulizer solution, Take by nebulization., Disp: , Rfl:     azithromycin (Z-JAMAL) 250 MG tablet, Take 1 tablet (250 mg total) by mouth once daily. Take first 2 tablets together, then 1 every day until finished., Disp: 6 tablet, Rfl: 0    benzonatate (TESSALON) 100 MG capsule, Take 100 mg by mouth 3 (three) times daily as needed for Cough., Disp: , Rfl:     citalopram (CELEXA) 20 MG tablet, Take 20 mg by mouth once daily., Disp: , Rfl:     cyclobenzaprine (FLEXERIL) 10 MG tablet, Take 1 tablet (10 mg total) by mouth 3 (three) times daily as needed for Muscle spasms., Disp: 20 tablet, Rfl: 0    dexlansoprazole (DEXILANT) 60 mg capsule, Take 60 mg by mouth once daily., Disp: , Rfl:     ezetimibe  (ZETIA) 10 mg tablet, Take 10 mg by mouth once daily., Disp: , Rfl:     furosemide (LASIX) 20 MG tablet, Take 20 mg by mouth once daily. , Disp: , Rfl:     ibuprofen (ADVIL,MOTRIN) 600 MG tablet, Take 1 tablet (600 mg total) by mouth every 6 (six) hours as needed for Pain., Disp: 90 tablet, Rfl: 1    lubiprostone (AMITIZA) 8 MCG Cap, Take 8 mcg by mouth 2 (two) times daily with meals., Disp: , Rfl:     meclizine (ANTIVERT) 32 MG tablet, Take 12.5 mg by mouth 3 (three) times daily as needed., Disp: , Rfl:     meloxicam (MOBIC) 15 MG tablet, Take 15 mg by mouth once daily., Disp: , Rfl:     metoprolol succinate (TOPROL-XL) 50 MG 24 hr tablet, Take 50 mg by mouth once daily., Disp: , Rfl:     montelukast (SINGULAIR) 10 mg tablet, Take 10 mg by mouth once daily. , Disp: , Rfl:     ondansetron (ZOFRAN) 4 MG tablet, Take 4 mg by mouth every 8 (eight) hours as needed for Nausea., Disp: , Rfl:     PAXLOVID, EUA, 150-100 mg DsPk, Take by mouth., Disp: , Rfl:     ranitidine (ZANTAC) 300 MG capsule, Take 300 mg by mouth once daily. , Disp: , Rfl:     ropinirole (REQUIP) 0.5 MG tablet, , Disp: , Rfl:     ropinirole (REQUIP) 5 MG tablet, Take 5 mg by mouth once daily. , Disp: , Rfl:     sucralfate (CARAFATE) 1 gram tablet, Take 1 g by mouth 4 (four) times daily., Disp: , Rfl:     SYMBICORT 80-4.5 mcg/actuation HFAA, , Disp: , Rfl:     valsartan (DIOVAN) 160 MG tablet, Take 160 mg by mouth once daily., Disp: , Rfl:       Review of Systems:  Constitutional: no fever or chills  Eyes: no visual changes  ENT: no nasal congestion or sore throat  Respiratory: no cough or shortness of breath  Cardiovascular: no chest pain  Gastrointestinal: no nausea or vomiting, tolerating diet  Musculoskeletal: pain and soreness    OBJECTIVE:      Vital Signs (Most Recent):  There were no vitals filed for this visit.    There is no height or weight on file to calculate BMI.      Physical Exam:  Constitutional: The patient appears well-developed and  well-nourished. No distress.   Skin: No lesions appreciated  Head: Normocephalic and atraumatic.   Nose: Nose normal.   Ears: No deformities seen  Eyes: Conjunctivae and EOM are normal.   Neck: No tracheal deviation present.   Cardiovascular: Normal rate and intact distal pulses.    Pulmonary/Chest: Effort normal. No respiratory distress.   Abdominal: There is no guarding.   Neurological: The patient is alert.   Psychiatric: The patient has a normal mood and affect.     Left Hand/Wrist Examination:  Splint removed, there is mild swelling and bruising to left forearm and distal radius. TTP over distal radius, radial pulse 2+. Capillary refill 2+. Sensation intact. Cast applied.    Diagnostic Results:     Imaging - I independently viewed the patient's imaging as well as the radiology report.  Xrays of the patient's left wrist  demonstrate distal radial acute fracture.     Xray left wrist 12/7/2022: distal radial acute fracture with routine healing, no displacement noted.     Xray left wrist 12/14/2022: distal radial acute fracture, slight displacement, this has changed from prior imaging.     Xray left wrist 12/21/2022: distal radial acute fracture, slight displcaement,     ASSESSMENT/PLAN:      Closed fracture of the distal end of left radius    I made the decision to obtain old records of the patient including previous notes and imaging. If new imaging was ordered today of the extremity or extremities evaluated. I independently reviewed and interpreted the left wrist as well as prior imaging. Reviewed imaging in detail with patient.     We discussed at length different treatment options including conservative vs surgical management. These include anti-inflammatories, acetaminophen, rest, ice, heat, formal physical therapy including strengthening and stretching exercises, home exercise programs, injections, and finally surgical intervention.      Patient not great surgical candidate due to medical history, will  continue with non-operative treatment. Fracture slightly displaced from initial presentation, however remains stable on imaging today. Recommend continuing immobilization and repeat imaging.     [id number]- Page Mcdonnell PA-C, applied fiberglass short arm cast to left arm. The patient demonstrated understanding and proper care. This was performed for 20 minutes. Activity modification- avoid use of affected limb, elevate above the heart when possible. Other cast care instructions given today. May use sling as tolerated.   Discussed with patient cast will likely stay on 4-6 weeks.  She will return in 2 weeks just for repeat imaging in cast.  I will call with results.  Then will return to clinic in another 4 weeks for repeat imaging and possible cast removal.  Alternate between ibuprofen and tylenol as needed for pain management  Encouraged high protein diet with lean meats  2 weeks for repeat imaging only, 4 weeks imaging and office visit.     Follow up: 4 weeks, get xrays in 2 weeks and 4 weeks  Xrays needed: left wrist in cast     All of the patient's questions were answered and the patient will contact us if they have any questions or concerns in the interim.       The total time spent for evaluation and management on 12/21/2022 including reviewing separately obtained history, performing a medically appropriate exam and evaluation, documenting clinical information in the health record, independently interpreting results and communicating them to the patient/family/caregiver, and ordering medications/tests/procedures was between 30-39 minutes.

## 2023-01-11 ENCOUNTER — OFFICE VISIT (OUTPATIENT)
Dept: ORTHOPEDICS | Facility: CLINIC | Age: 78
End: 2023-01-11
Payer: MEDICARE

## 2023-01-11 VITALS
BODY MASS INDEX: 26.37 KG/M2 | WEIGHT: 168 LBS | RESPIRATION RATE: 18 BRPM | HEIGHT: 67 IN | HEART RATE: 73 BPM | SYSTOLIC BLOOD PRESSURE: 136 MMHG | DIASTOLIC BLOOD PRESSURE: 70 MMHG

## 2023-01-11 DIAGNOSIS — S52.502D CLOSED FRACTURE OF DISTAL END OF LEFT RADIUS WITH ROUTINE HEALING, UNSPECIFIED FRACTURE MORPHOLOGY, SUBSEQUENT ENCOUNTER: Primary | ICD-10-CM

## 2023-01-11 PROCEDURE — 3075F PR MOST RECENT SYSTOLIC BLOOD PRESS GE 130-139MM HG: ICD-10-PCS | Mod: CPTII,S$GLB,,

## 2023-01-11 PROCEDURE — 3288F FALL RISK ASSESSMENT DOCD: CPT | Mod: CPTII,S$GLB,,

## 2023-01-11 PROCEDURE — 1100F PTFALLS ASSESS-DOCD GE2>/YR: CPT | Mod: CPTII,S$GLB,,

## 2023-01-11 PROCEDURE — 3288F PR FALLS RISK ASSESSMENT DOCUMENTED: ICD-10-PCS | Mod: CPTII,S$GLB,,

## 2023-01-11 PROCEDURE — 99213 OFFICE O/P EST LOW 20 MIN: CPT | Mod: S$GLB,,,

## 2023-01-11 PROCEDURE — 99999 PR PBB SHADOW E&M-EST. PATIENT-LVL III: ICD-10-PCS | Mod: PBBFAC,,,

## 2023-01-11 PROCEDURE — 1125F PR PAIN SEVERITY QUANTIFIED, PAIN PRESENT: ICD-10-PCS | Mod: CPTII,S$GLB,,

## 2023-01-11 PROCEDURE — 1100F PR PT FALLS ASSESS DOC 2+ FALLS/FALL W/INJURY/YR: ICD-10-PCS | Mod: CPTII,S$GLB,,

## 2023-01-11 PROCEDURE — 3075F SYST BP GE 130 - 139MM HG: CPT | Mod: CPTII,S$GLB,,

## 2023-01-11 PROCEDURE — 99213 PR OFFICE/OUTPT VISIT, EST, LEVL III, 20-29 MIN: ICD-10-PCS | Mod: S$GLB,,,

## 2023-01-11 PROCEDURE — 99999 PR PBB SHADOW E&M-EST. PATIENT-LVL III: CPT | Mod: PBBFAC,,,

## 2023-01-11 PROCEDURE — 3078F PR MOST RECENT DIASTOLIC BLOOD PRESSURE < 80 MM HG: ICD-10-PCS | Mod: CPTII,S$GLB,,

## 2023-01-11 PROCEDURE — 1159F PR MEDICATION LIST DOCUMENTED IN MEDICAL RECORD: ICD-10-PCS | Mod: CPTII,S$GLB,,

## 2023-01-11 PROCEDURE — 3078F DIAST BP <80 MM HG: CPT | Mod: CPTII,S$GLB,,

## 2023-01-11 PROCEDURE — 1159F MED LIST DOCD IN RCRD: CPT | Mod: CPTII,S$GLB,,

## 2023-01-11 PROCEDURE — 1125F AMNT PAIN NOTED PAIN PRSNT: CPT | Mod: CPTII,S$GLB,,

## 2023-01-11 RX ORDER — ATORVASTATIN CALCIUM 20 MG/1
TABLET, FILM COATED ORAL
COMMUNITY
Start: 2023-01-02

## 2023-01-11 RX ORDER — FAMOTIDINE 20 MG/1
TABLET, FILM COATED ORAL
COMMUNITY
Start: 2023-01-02

## 2023-01-11 RX ORDER — CLOPIDOGREL BISULFATE 75 MG/1
TABLET ORAL
COMMUNITY
Start: 2023-01-02

## 2023-01-11 RX ORDER — TRAZODONE HYDROCHLORIDE 50 MG/1
TABLET ORAL
COMMUNITY
Start: 2023-01-02

## 2023-01-11 NOTE — PROGRESS NOTES
SUBJECTIVE:      Chief Complaint: left wrist fracture    History of Present Illness:  Patient is a 77 y.o. right hand dominant female who presents today with complaints of left distal radius fracture. Patient was mopping this morning and sustained FOOSH type injury. Patient presented to [facility] on 11/30/2022, ED reduced fracture, placed short arm splint and patient was immediately sent to orthopedic clinic. She presents with her daughter and states she resides at her daughters for the time being.    The patient is a/an retired.    Onset of symptoms/DOI was 11/30/2022.    Symptoms are aggravated by movement.    Symptoms are alleviated by immobilization and medication.    Symptoms consist of pain, decreased ROM, and numbness/tingling.    The patient rates their pain as a 10/10.     The patient denies any fevers, chills, N/V, D/C and presents for evaluation.     ____________________________________________________________________    Interval history 12/7/2022 : Patient returns today for 1 week follow up of left distal radius fracture.  She has been taking Ibuprofen 4x daily for pain, does not want to take tylenol if she does not have to. No concerns today.     ____________________________________________________________________    Interval history 12/14/2022 : Patient returns today for 2 week follow up of left distal radius fracture. Her BP today is 94/38 and she endorses CP yesterday and is currently endorsing dizziness. Reports she did take her blood pressure medicine this morning. Denies fevers, nausea, vomiting or SOB. Pain 9/10 today.     ____________________________________________________________________    Interval history 12/21/2022 : Patient returns today for follow up of left distal radius fracture. Presents to clinic with daughter. At last visit she was moderately swollen and BP was 94/38, she was placed in short arm splint and sent to the ER. Since this, patient reports mild continued pain to left  forearm. No other concerns at his time.       ____________________________________________________________________    Interval history 1/11/2023 : Patient returns today for follow up of left distal radius fracture. At last visit, she was placed in short arm cast. She has been taking cast on and off for last 2 weeks, she did not contact clinic to have cast reapplied.         Past Medical History:   Diagnosis Date    Anemia     Arthritis     Asthma     COPD (chronic obstructive pulmonary disease)     Coronary artery disease 1990's    with stents on plavix    Gastritis     GERD (gastroesophageal reflux disease)     without gastritis    Hiatal hernia     Hypertension     Weight loss      Past Surgical History:   Procedure Laterality Date    BACK SURGERY      CATARACT EXTRACTION      CHOLECYSTECTOMY      lap    HYSTERECTOMY      TOTAL HIP ARTHROPLASTY       Review of patient's allergies indicates:   Allergen Reactions    Codeine Hives    Condrin-la Swelling    Levofloxacin Swelling    Tramadol Swelling    Penicillins Swelling    Demerol [meperidine] Rash     Social History     Social History Narrative    Not on file     Family History   Problem Relation Age of Onset    Diabetes Mother     Anesthesia problems Neg Hx          Current Outpatient Medications:     acetaminophen (TYLENOL) 500 MG tablet, Take 1 tablet (500 mg total) by mouth every 6 (six) hours as needed for Pain., Disp: 30 tablet, Rfl: 0    albuterol (PROVENTIL/VENTOLIN HFA) 90 mcg/actuation inhaler, Inhale into the lungs., Disp: , Rfl:     albuterol-ipratropium (DUO-NEB) 2.5 mg-0.5 mg/3 mL nebulizer solution, Take by nebulization., Disp: , Rfl:     azithromycin (Z-JAMAL) 250 MG tablet, Take 1 tablet (250 mg total) by mouth once daily. Take first 2 tablets together, then 1 every day until finished., Disp: 6 tablet, Rfl: 0    benzonatate (TESSALON) 100 MG capsule, Take 100 mg by mouth 3 (three) times daily as needed for Cough., Disp: , Rfl:     citalopram  (CELEXA) 20 MG tablet, Take 20 mg by mouth once daily., Disp: , Rfl:     cyclobenzaprine (FLEXERIL) 10 MG tablet, Take 1 tablet (10 mg total) by mouth 3 (three) times daily as needed for Muscle spasms., Disp: 20 tablet, Rfl: 0    dexlansoprazole (DEXILANT) 60 mg capsule, Take 60 mg by mouth once daily., Disp: , Rfl:     ezetimibe (ZETIA) 10 mg tablet, Take 10 mg by mouth once daily., Disp: , Rfl:     furosemide (LASIX) 20 MG tablet, Take 20 mg by mouth once daily. , Disp: , Rfl:     ibuprofen (ADVIL,MOTRIN) 600 MG tablet, Take 1 tablet (600 mg total) by mouth every 6 (six) hours as needed for Pain., Disp: 90 tablet, Rfl: 1    lubiprostone (AMITIZA) 8 MCG Cap, Take 8 mcg by mouth 2 (two) times daily with meals., Disp: , Rfl:     meclizine (ANTIVERT) 32 MG tablet, Take 12.5 mg by mouth 3 (three) times daily as needed., Disp: , Rfl:     meloxicam (MOBIC) 15 MG tablet, Take 15 mg by mouth once daily., Disp: , Rfl:     metoprolol succinate (TOPROL-XL) 50 MG 24 hr tablet, Take 50 mg by mouth once daily., Disp: , Rfl:     montelukast (SINGULAIR) 10 mg tablet, Take 10 mg by mouth once daily. , Disp: , Rfl:     ondansetron (ZOFRAN) 4 MG tablet, Take 4 mg by mouth every 8 (eight) hours as needed for Nausea., Disp: , Rfl:     PAXLOVID, EUA, 150-100 mg DsPk, Take by mouth., Disp: , Rfl:     ranitidine (ZANTAC) 300 MG capsule, Take 300 mg by mouth once daily. , Disp: , Rfl:     ropinirole (REQUIP) 0.5 MG tablet, , Disp: , Rfl:     ropinirole (REQUIP) 5 MG tablet, Take 5 mg by mouth once daily. , Disp: , Rfl:     sucralfate (CARAFATE) 1 gram tablet, Take 1 g by mouth 4 (four) times daily., Disp: , Rfl:     SYMBICORT 80-4.5 mcg/actuation HFAA, , Disp: , Rfl:     valsartan (DIOVAN) 160 MG tablet, Take 160 mg by mouth once daily., Disp: , Rfl:       Review of Systems:  Constitutional: no fever or chills  Eyes: no visual changes  ENT: no nasal congestion or sore throat  Respiratory: no cough or shortness of breath  Cardiovascular: no  chest pain  Gastrointestinal: no nausea or vomiting, tolerating diet  Musculoskeletal: pain and soreness    OBJECTIVE:      Vital Signs (Most Recent):  There were no vitals filed for this visit.    There is no height or weight on file to calculate BMI.      Physical Exam:  Constitutional: The patient appears well-developed and well-nourished. No distress.   Skin: No lesions appreciated  Head: Normocephalic and atraumatic.   Nose: Nose normal.   Ears: No deformities seen  Eyes: Conjunctivae and EOM are normal.   Neck: No tracheal deviation present.   Cardiovascular: Normal rate and intact distal pulses.    Pulmonary/Chest: Effort normal. No respiratory distress.   Abdominal: There is no guarding.   Neurological: The patient is alert.   Psychiatric: The patient has a normal mood and affect.     Left Hand/Wrist Examination:  Cast not on, there is mild swelling TTP over distal radius, radial pulse 2+. Capillary refill 2+. Sensation intact.    Diagnostic Results:     Imaging - I independently viewed the patient's imaging as well as the radiology report.  Xrays of the patient's left wrist  demonstrate distal radial acute fracture.     Xray left wrist 12/7/2022: distal radial acute fracture with routine healing, no displacement noted.     Xray left wrist 12/14/2022: distal radial acute fracture, slight displacement, this has changed from prior imaging.     Xray left wrist 12/21/2022: distal radial acute fracture, slight displacement    Xray left wrist 1/11/2023: in cast, mild angulated distal radius fracture, stable alignment with progressed callus formation    ASSESSMENT/PLAN:      Closed fracture of the distal end of left radius    I made the decision to obtain old records of the patient including previous notes and imaging. If new imaging was ordered today of the extremity or extremities evaluated. I independently reviewed and interpreted the left wrist as well as prior imaging. Reviewed imaging in detail with patient.      We discussed at length different treatment options including conservative vs surgical management. These include anti-inflammatories, acetaminophen, rest, ice, heat, formal physical therapy including strengthening and stretching exercises, home exercise programs, injections, and finally surgical intervention.      Patient since she has not been in a cast for the past 2-3 weeks even though she was supposed to be, we will not put her back into a cast today.  Fracture stable on x-rays from today.  She is a nonoperative candidate.  Wrist brace given today. Wear as much as tolerated, may remove to shower.     Follow up 4 weeks, no xrays     All of the patient's questions were answered and the patient will contact us if they have any questions or concerns in the interim.

## 2023-02-08 ENCOUNTER — TELEPHONE (OUTPATIENT)
Dept: ORTHOPEDICS | Facility: CLINIC | Age: 78
End: 2023-02-08
Payer: MEDICARE

## 2023-02-08 NOTE — TELEPHONE ENCOUNTER
Caregiver called to say they will no longer be needing the Ortho service but will call if they ever need anything. Pt. V/u